# Patient Record
Sex: MALE | Race: WHITE | NOT HISPANIC OR LATINO | URBAN - METROPOLITAN AREA
[De-identification: names, ages, dates, MRNs, and addresses within clinical notes are randomized per-mention and may not be internally consistent; named-entity substitution may affect disease eponyms.]

---

## 2020-11-03 ENCOUNTER — HOSPITAL ENCOUNTER (OUTPATIENT)
Dept: URGENT CARE | Facility: CLINIC | Age: 30
Discharge: HOME | End: 2020-11-03

## 2021-09-08 ENCOUNTER — OFFICE VISIT (OUTPATIENT)
Dept: NEPHROLOGY | Facility: CLINIC | Age: 31
End: 2021-09-08
Payer: OTHER GOVERNMENT

## 2021-09-08 VITALS
BODY MASS INDEX: 30.38 KG/M2 | HEART RATE: 65 BPM | DIASTOLIC BLOOD PRESSURE: 78 MMHG | WEIGHT: 217 LBS | HEIGHT: 71 IN | SYSTOLIC BLOOD PRESSURE: 120 MMHG

## 2021-09-08 DIAGNOSIS — R79.89 ELEVATED SERUM CREATININE: Primary | ICD-10-CM

## 2021-09-08 PROCEDURE — 3074F SYST BP LT 130 MM HG: CPT | Performed by: INTERNAL MEDICINE

## 2021-09-08 PROCEDURE — 99243 OFF/OP CNSLTJ NEW/EST LOW 30: CPT | Performed by: INTERNAL MEDICINE

## 2021-09-08 PROCEDURE — 3078F DIAST BP <80 MM HG: CPT | Performed by: INTERNAL MEDICINE

## 2021-09-08 PROCEDURE — 3008F BODY MASS INDEX DOCD: CPT | Performed by: INTERNAL MEDICINE

## 2021-09-08 RX ORDER — SILDENAFIL 50 MG/1
50 TABLET, FILM COATED ORAL AS NEEDED
COMMUNITY
Start: 2021-07-27

## 2021-09-08 NOTE — PROGRESS NOTES
Consult Note      REASON FOR CONSULT: Elevated creatinine         HPI:   Aretta Barthel is a 27year old male with Patient presents with:  New Patient: NEW PT PCP REF GFR lower [address]    No primary care provider on file.     Mr. Richard Pepper was seen in Number of children: Not on file      Years of education: Not on file      Highest education level: Not on file         Family History:  No family history on file.          PHYSICAL EXAM:   /78   Pulse 65   Ht 5' 11\" (1.803 m)   Wt 217 lb (98.4 kg)

## 2021-11-16 ENCOUNTER — APPOINTMENT (OUTPATIENT)
Dept: GENERAL RADIOLOGY | Facility: HOSPITAL | Age: 31
End: 2021-11-16
Attending: EMERGENCY MEDICINE
Payer: OTHER GOVERNMENT

## 2021-11-16 ENCOUNTER — HOSPITAL ENCOUNTER (EMERGENCY)
Facility: HOSPITAL | Age: 31
Discharge: HOME | End: 2021-11-17
Attending: EMERGENCY MEDICINE
Payer: OTHER GOVERNMENT

## 2021-11-16 VITALS
DIASTOLIC BLOOD PRESSURE: 83 MMHG | OXYGEN SATURATION: 96 % | WEIGHT: 212 LBS | HEART RATE: 72 BPM | BODY MASS INDEX: 29.68 KG/M2 | TEMPERATURE: 97 F | HEIGHT: 71 IN | RESPIRATION RATE: 18 BRPM | SYSTOLIC BLOOD PRESSURE: 145 MMHG

## 2021-11-16 DIAGNOSIS — M54.40 BACK PAIN OF LUMBAR REGION WITH SCIATICA: Primary | ICD-10-CM

## 2021-11-16 PROCEDURE — 96375 TX/PRO/DX INJ NEW DRUG ADDON: CPT

## 2021-11-16 PROCEDURE — 99284 EMERGENCY DEPT VISIT MOD MDM: CPT

## 2021-11-16 PROCEDURE — 72110 X-RAY EXAM L-2 SPINE 4/>VWS: CPT | Performed by: EMERGENCY MEDICINE

## 2021-11-16 PROCEDURE — 96374 THER/PROPH/DIAG INJ IV PUSH: CPT

## 2021-11-16 RX ORDER — MORPHINE SULFATE 4 MG/ML
4 INJECTION, SOLUTION INTRAMUSCULAR; INTRAVENOUS EVERY 30 MIN PRN
Status: DISCONTINUED | OUTPATIENT
Start: 2021-11-16 | End: 2021-11-17

## 2021-11-16 RX ORDER — ONDANSETRON 2 MG/ML
4 INJECTION INTRAMUSCULAR; INTRAVENOUS ONCE
Status: COMPLETED | OUTPATIENT
Start: 2021-11-16 | End: 2021-11-16

## 2021-11-16 RX ORDER — DIPHENHYDRAMINE HYDROCHLORIDE 50 MG/ML
INJECTION INTRAMUSCULAR; INTRAVENOUS
Status: COMPLETED
Start: 2021-11-16 | End: 2021-11-16

## 2021-11-16 RX ORDER — DIPHENHYDRAMINE HYDROCHLORIDE 50 MG/ML
25 INJECTION INTRAMUSCULAR; INTRAVENOUS ONCE
Status: COMPLETED | OUTPATIENT
Start: 2021-11-16 | End: 2021-11-16

## 2021-11-16 RX ORDER — ONDANSETRON 2 MG/ML
INJECTION INTRAMUSCULAR; INTRAVENOUS
Status: COMPLETED
Start: 2021-11-16 | End: 2021-11-16

## 2021-11-17 RX ORDER — METHYLPREDNISOLONE 4 MG/1
TABLET ORAL
Qty: 21 TABLET | Refills: 0 | Status: SHIPPED | OUTPATIENT
Start: 2021-11-17

## 2021-11-17 RX ORDER — TRAMADOL HYDROCHLORIDE 50 MG/1
TABLET ORAL EVERY 6 HOURS PRN
Qty: 10 TABLET | Refills: 0 | Status: SHIPPED | OUTPATIENT
Start: 2021-11-17 | End: 2021-11-24

## 2021-11-17 NOTE — ED PROVIDER NOTES
Patient Seen in: [facility] Emergency Department      History   Patient presents with:  Back Pain    Stated Complaint: BACK PAIN    Subjective:   HPI    Tariq Perry is a 44-year-old male presenting with back pain.   This is pain that he started noticing primary care doctor and he is instructed to follow-up with this individual within the week for reevaluation should he have worsening symptoms he is to return to the emergency department                             Disposition and Fatou

## 2021-11-17 NOTE — ED INITIAL ASSESSMENT (HPI)
Patient reports lower back pain for two days, unsure if there was any injury. Denies any urinary symptoms.

## 2024-01-29 ENCOUNTER — APPOINTMENT (OUTPATIENT)
Dept: ULTRASOUND IMAGING | Facility: HOSPITAL | Age: 34
End: 2024-01-29
Attending: EMERGENCY MEDICINE
Payer: OTHER GOVERNMENT

## 2024-01-29 ENCOUNTER — HOSPITAL ENCOUNTER (EMERGENCY)
Facility: HOSPITAL | Age: 34
Discharge: HOME | End: 2024-01-29
Attending: EMERGENCY MEDICINE
Payer: OTHER GOVERNMENT

## 2024-01-29 VITALS
DIASTOLIC BLOOD PRESSURE: 76 MMHG | TEMPERATURE: 98 F | RESPIRATION RATE: 16 BRPM | HEART RATE: 79 BPM | OXYGEN SATURATION: 96 % | HEIGHT: 71 IN | BODY MASS INDEX: 29.4 KG/M2 | WEIGHT: 210 LBS | SYSTOLIC BLOOD PRESSURE: 128 MMHG

## 2024-01-29 DIAGNOSIS — N50.812 PAIN IN BOTH TESTICLES: Primary | ICD-10-CM

## 2024-01-29 DIAGNOSIS — N50.811 PAIN IN BOTH TESTICLES: Primary | ICD-10-CM

## 2024-01-29 LAB
BILIRUB UR QL STRIP.AUTO: NEGATIVE
CLARITY UR REFRACT.AUTO: CLEAR
COLOR UR AUTO: COLORLESS
GLUCOSE UR STRIP.AUTO-MCNC: NORMAL MG/DL
KETONES UR STRIP.AUTO-MCNC: NEGATIVE MG/DL
LEUKOCYTE ESTERASE UR QL STRIP.AUTO: NEGATIVE
NITRITE UR QL STRIP.AUTO: NEGATIVE
PH UR STRIP.AUTO: 5.5 [PH] (ref 5–8)
PROT UR STRIP.AUTO-MCNC: NEGATIVE MG/DL
RBC UR QL AUTO: NEGATIVE
SP GR UR STRIP.AUTO: 1.01 (ref 1–1.03)
UROBILINOGEN UR STRIP.AUTO-MCNC: NORMAL MG/DL

## 2024-01-29 PROCEDURE — 87491 CHLMYD TRACH DNA AMP PROBE: CPT | Performed by: EMERGENCY MEDICINE

## 2024-01-29 PROCEDURE — 99285 EMERGENCY DEPT VISIT HI MDM: CPT

## 2024-01-29 PROCEDURE — 87591 N.GONORRHOEAE DNA AMP PROB: CPT | Performed by: EMERGENCY MEDICINE

## 2024-01-29 PROCEDURE — 81003 URINALYSIS AUTO W/O SCOPE: CPT | Performed by: EMERGENCY MEDICINE

## 2024-01-29 PROCEDURE — 93975 VASCULAR STUDY: CPT | Performed by: EMERGENCY MEDICINE

## 2024-01-29 PROCEDURE — 99284 EMERGENCY DEPT VISIT MOD MDM: CPT

## 2024-01-29 PROCEDURE — 76870 US EXAM SCROTUM: CPT | Performed by: EMERGENCY MEDICINE

## 2024-01-29 RX ORDER — FLUOXETINE 10 MG/1
10 CAPSULE ORAL DAILY
COMMUNITY
Start: 2023-11-13

## 2024-01-29 RX ORDER — TRIAMCINOLONE ACETONIDE 1 MG/G
25 CREAM TOPICAL EVERY OTHER DAY
COMMUNITY
Start: 2023-05-11

## 2024-01-29 NOTE — ED PROVIDER NOTES
Patient Seen in: [facility] Emergency Department      History     Chief Complaint   Patient presents with    Eval-G     Stated Complaint: testicular pain 11 days    Subjective:   HPI    33-year-old male presents reporting intermittent bilateral testicular pain for the last week and a half.  He is concerned about a sexual encounter he had 2 weeks ago, though he did wear a condom.  He said that within a couple of days he started having discomfort in his testicles.  Denies any penile discharge.  No abdominal or flank pain.  No urinary symptoms.  No fevers.  No testicular swelling reported.  He has a history of previous vasectomy.    Objective:   History reviewed. No pertinent past medical history.           History reviewed. No pertinent surgical history.             Social History     Socioeconomic History    Marital status: Unknown   Tobacco Use    Smoking status: Never    Smokeless tobacco: Never   Vaping Use    Vaping Use: Never used   Substance and Sexual Activity    Alcohol use: Yes    Drug use: Never              Review of Systems    Positive for stated complaint: testicular pain 11 days  Other systems are as noted in HPI.  Constitutional and vital signs reviewed.      All other systems reviewed and negative except as noted above.    Physical Exam     ED Triage Vitals [01/29/24 1122]   /76   Pulse 79   Resp 16   Temp 98.4 °F (36.9 °C)   Temp src Temporal   SpO2 96 %   O2 Device None (Room air)       Current:/76   Pulse 79   Temp 98.4 °F (36.9 °C) (Temporal)   Resp 16   Ht 180.3 cm (5' 11\")   Wt 95.3 kg   SpO2 96%   BMI 29.29 kg/m²         Physical Exam    General:  Vitals as listed.  No acute distress   : Circumcised.  No penile lesions.  No penile discharge.  No palpable testicular masses or tenderness.  No edema.  No palpable inguinal lymph nodes.  No palpable hernias.   Neuro: Alert oriented and nonfocal   Skin: no rashes or nodules    ED Course     Labs Reviewed   URINALYSIS WITH  CULTURE REFLEX - Abnormal; Notable for the following components:       Result Value    Urine Color Colorless (*)     All other components within normal limits   CHLAMYDIA/GONOCOCCUS, HERIBERTO             US SCROTUM W/ DOPPLER (CPT=93975/94664)    Result Date: 1/29/2024  PROCEDURE:  US SCROTUM W/ DOPPLER (CPT=93975/01639)  COMPARISON:  None.  INDICATIONS:  eval for torsion  TECHNIQUE:  Real time grey scale ultrasound was performed of the scrotal contents including the testicles, epididymis, spermatic cord, and scrotal wall. A duplex scan with B-mode, Doppler color flow, and spectral analysis were also performed.  PATIENT STATED HISTORY: (As transcribed by Technologist)     FINDINGS:  TESTES:  Right testicle measures 5.0 x 2.3 x 3.0 cm. Left testicle measures 4.8 x 2.2 x 3.1 cm.  Arterial and venous flow is demonstrated in both testicles.  EPIDIDYMIS:  Negative. HYDROCELE:  Small left hydrocele. VARICOCELE:  Negative OTHER:  Negative.            CONCLUSION:  No evidence of torsion.  No lesions in the testicles.   LOCATION:  [facility]    Dictated by (CST): Jerson Wilson MD on 1/29/2024 at 12:21 PM     Finalized by (CST): Jerson Wilson MD on 1/29/2024 at 12:23 PM               MDM      33-year-old male presents with a week and a half of bilateral testicular pain.  Intermittent.  Moves side-to-side.  No urinary symptoms.  Concerned about a recent sexual encounter    Differential includes but is not limited to torsion, epididymitis, mass, a life threat.    Ultrasound bilateral testicles, urinalysis, STD cultures ordered for further evaluation.    My independent interpretation of ultrasound is that there is blood flow to bilateral testicles.    Radiology reports no evidence of lesions or inflammation.  They do report a small left hydrocele.    Unclear etiology of intermittent testicular pain for the last week and a half at this time.  No evidence of infection.  No masses.  No evidence of torsion.  He has no abdominal or  flank pain to suggest kidney stones.  He wore a condom with his most recent exposure that is of concern to him.  Reviewed all results with patient and recommend follow-up with urology if symptoms persist but at this time there is no evidence of an emergent condition.                                       Medical Decision Making      Disposition and Plan     Clinical Impression:  1. Pain in both testicles         Disposition:  Discharge  1/29/2024  1:23 pm    Follow-up:  Silvio Membreno MD  [address] [address]  [phone]    Follow up  Urology specialist for further evaluation if pain persists          Medications Prescribed:  Current Discharge Medication List

## 2024-01-29 NOTE — ED INITIAL ASSESSMENT (HPI)
Pain intermittently to bilateral testicles.  Pt denies fevers or painful urination.  Pt states no discharge.  Pt denies swelling but painful to touch.  No redness.  No injury.

## 2024-01-30 LAB
C TRACH DNA SPEC QL NAA+PROBE: NEGATIVE
N GONORRHOEA DNA SPEC QL NAA+PROBE: NEGATIVE